# Patient Record
Sex: MALE | Race: WHITE | NOT HISPANIC OR LATINO
[De-identification: names, ages, dates, MRNs, and addresses within clinical notes are randomized per-mention and may not be internally consistent; named-entity substitution may affect disease eponyms.]

---

## 2017-01-11 ENCOUNTER — APPOINTMENT (OUTPATIENT)
Dept: CARDIOTHORACIC SURGERY | Facility: CLINIC | Age: 67
End: 2017-01-11

## 2017-01-11 VITALS
HEART RATE: 63 BPM | TEMPERATURE: 97.4 F | RESPIRATION RATE: 17 BRPM | WEIGHT: 189 LBS | OXYGEN SATURATION: 93 % | DIASTOLIC BLOOD PRESSURE: 97 MMHG | BODY MASS INDEX: 25.6 KG/M2 | HEIGHT: 72 IN | SYSTOLIC BLOOD PRESSURE: 171 MMHG

## 2017-01-11 DIAGNOSIS — I35.1 NONRHEUMATIC AORTIC (VALVE) INSUFFICIENCY: ICD-10-CM

## 2017-01-11 DIAGNOSIS — I65.23 OCCLUSION AND STENOSIS OF BILATERAL CAROTID ARTERIES: ICD-10-CM

## 2017-01-11 RX ORDER — BLOOD-GLUCOSE METER
KIT MISCELLANEOUS
Qty: 1 | Refills: 0 | Status: ACTIVE | COMMUNITY
Start: 2017-01-11 | End: 1900-01-01

## 2017-07-03 ENCOUNTER — RX RENEWAL (OUTPATIENT)
Age: 67
End: 2017-07-03

## 2017-09-05 ENCOUNTER — RX RENEWAL (OUTPATIENT)
Age: 67
End: 2017-09-05

## 2017-10-10 ENCOUNTER — RX RENEWAL (OUTPATIENT)
Age: 67
End: 2017-10-10

## 2017-10-10 ENCOUNTER — MEDICATION RENEWAL (OUTPATIENT)
Age: 67
End: 2017-10-10

## 2017-10-13 ENCOUNTER — APPOINTMENT (OUTPATIENT)
Dept: HEART AND VASCULAR | Facility: CLINIC | Age: 67
End: 2017-10-13
Payer: COMMERCIAL

## 2017-10-13 VITALS
BODY MASS INDEX: 27.67 KG/M2 | HEART RATE: 63 BPM | DIASTOLIC BLOOD PRESSURE: 94 MMHG | RESPIRATION RATE: 12 BRPM | WEIGHT: 204 LBS | TEMPERATURE: 97.8 F | OXYGEN SATURATION: 97 % | SYSTOLIC BLOOD PRESSURE: 188 MMHG

## 2017-10-13 DIAGNOSIS — I10 ESSENTIAL (PRIMARY) HYPERTENSION: ICD-10-CM

## 2017-10-13 DIAGNOSIS — E55.9 VITAMIN D DEFICIENCY, UNSPECIFIED: ICD-10-CM

## 2017-10-13 DIAGNOSIS — I77.810 THORACIC AORTIC ECTASIA: ICD-10-CM

## 2017-10-13 DIAGNOSIS — R73.09 OTHER ABNORMAL GLUCOSE: ICD-10-CM

## 2017-10-13 DIAGNOSIS — E78.5 HYPERLIPIDEMIA, UNSPECIFIED: ICD-10-CM

## 2017-10-13 DIAGNOSIS — C61 MALIGNANT NEOPLASM OF PROSTATE: ICD-10-CM

## 2017-10-13 PROCEDURE — 99214 OFFICE O/P EST MOD 30 MIN: CPT | Mod: 25

## 2017-10-13 PROCEDURE — 93306 TTE W/DOPPLER COMPLETE: CPT | Mod: XE

## 2017-10-13 PROCEDURE — 93880 EXTRACRANIAL BILAT STUDY: CPT | Mod: XE

## 2017-10-13 PROCEDURE — 93000 ELECTROCARDIOGRAM COMPLETE: CPT

## 2017-10-13 PROCEDURE — 36415 COLL VENOUS BLD VENIPUNCTURE: CPT

## 2017-10-16 LAB
25(OH)D3 SERPL-MCNC: 27.2 NG/ML
ALBUMIN SERPL ELPH-MCNC: 4.3 G/DL
ALP BLD-CCNC: 50 U/L
ALT SERPL-CCNC: 31 U/L
ANION GAP SERPL CALC-SCNC: 12 MMOL/L
APPEARANCE: CLEAR
AST SERPL-CCNC: 15 U/L
BACTERIA: NEGATIVE
BASOPHILS # BLD AUTO: 0.03 K/UL
BASOPHILS NFR BLD AUTO: 0.5 %
BILIRUB SERPL-MCNC: 0.7 MG/DL
BILIRUBIN URINE: NEGATIVE
BLOOD URINE: NEGATIVE
BUN SERPL-MCNC: 19 MG/DL
CALCIUM SERPL-MCNC: 9.6 MG/DL
CHLORIDE SERPL-SCNC: 102 MMOL/L
CHOLEST SERPL-MCNC: 160 MG/DL
CHOLEST/HDLC SERPL: 3.6 RATIO
CO2 SERPL-SCNC: 25 MMOL/L
COLOR: YELLOW
CREAT SERPL-MCNC: 1.14 MG/DL
EOSINOPHIL # BLD AUTO: 0.2 K/UL
EOSINOPHIL NFR BLD AUTO: 3.3 %
GLUCOSE QUALITATIVE U: NEGATIVE MG/DL
GLUCOSE SERPL-MCNC: 93 MG/DL
HBA1C MFR BLD HPLC: 5.5 %
HCT VFR BLD CALC: 47.9 %
HCV AB SER QL: NONREACTIVE
HCV S/CO RATIO: 0.17 S/CO
HDLC SERPL-MCNC: 45 MG/DL
HGB BLD-MCNC: 15.2 G/DL
IMM GRANULOCYTES NFR BLD AUTO: 0.2 %
KETONES URINE: NEGATIVE
LDLC SERPL CALC-MCNC: 97 MG/DL
LEUKOCYTE ESTERASE URINE: NEGATIVE
LYMPHOCYTES # BLD AUTO: 1.67 K/UL
LYMPHOCYTES NFR BLD AUTO: 27.6 %
MAGNESIUM SERPL-MCNC: 2 MG/DL
MAN DIFF?: NORMAL
MCHC RBC-ENTMCNC: 31.2 PG
MCHC RBC-ENTMCNC: 31.7 GM/DL
MCV RBC AUTO: 98.4 FL
MICROSCOPIC-UA: NORMAL
MONOCYTES # BLD AUTO: 0.5 K/UL
MONOCYTES NFR BLD AUTO: 8.3 %
NEUTROPHILS # BLD AUTO: 3.65 K/UL
NEUTROPHILS NFR BLD AUTO: 60.1 %
NITRITE URINE: NEGATIVE
PH URINE: 5.5
PLATELET # BLD AUTO: 206 K/UL
POTASSIUM SERPL-SCNC: 4.7 MMOL/L
PROT SERPL-MCNC: 7 G/DL
PROTEIN URINE: NEGATIVE MG/DL
PSA SERPL-MCNC: <0.01 NG/ML
RBC # BLD: 4.87 M/UL
RBC # FLD: 13.6 %
RED BLOOD CELLS URINE: 0 /HPF
SODIUM SERPL-SCNC: 139 MMOL/L
SPECIFIC GRAVITY URINE: 1.01
SQUAMOUS EPITHELIAL CELLS: 0 /HPF
TRIGL SERPL-MCNC: 90 MG/DL
TSH SERPL-ACNC: 1.24 UIU/ML
UROBILINOGEN URINE: NEGATIVE MG/DL
WBC # FLD AUTO: 6.06 K/UL
WHITE BLOOD CELLS URINE: 0 /HPF

## 2018-01-10 ENCOUNTER — APPOINTMENT (OUTPATIENT)
Dept: CARDIOTHORACIC SURGERY | Facility: CLINIC | Age: 68
End: 2018-01-10

## 2018-02-06 ENCOUNTER — APPOINTMENT (OUTPATIENT)
Dept: CT IMAGING | Facility: HOSPITAL | Age: 68
End: 2018-02-06

## 2018-02-06 ENCOUNTER — TRANSCRIPTION ENCOUNTER (OUTPATIENT)
Age: 68
End: 2018-02-06

## 2018-02-09 ENCOUNTER — OTHER (OUTPATIENT)
Age: 68
End: 2018-02-09

## 2018-03-29 ENCOUNTER — OTHER (OUTPATIENT)
Age: 68
End: 2018-03-29

## 2018-04-11 ENCOUNTER — RX RENEWAL (OUTPATIENT)
Age: 68
End: 2018-04-11

## 2018-04-13 ENCOUNTER — APPOINTMENT (OUTPATIENT)
Dept: CARDIOTHORACIC SURGERY | Facility: CLINIC | Age: 68
End: 2018-04-13

## 2018-07-23 ENCOUNTER — RX RENEWAL (OUTPATIENT)
Age: 68
End: 2018-07-23

## 2018-08-08 ENCOUNTER — OTHER (OUTPATIENT)
Age: 68
End: 2018-08-08

## 2018-08-09 ENCOUNTER — APPOINTMENT (OUTPATIENT)
Dept: CT IMAGING | Facility: HOSPITAL | Age: 68
End: 2018-08-09

## 2018-09-14 ENCOUNTER — APPOINTMENT (OUTPATIENT)
Dept: CARDIOTHORACIC SURGERY | Facility: CLINIC | Age: 68
End: 2018-09-14
Payer: COMMERCIAL

## 2018-09-14 VITALS
HEART RATE: 63 BPM | OXYGEN SATURATION: 99 % | SYSTOLIC BLOOD PRESSURE: 173 MMHG | WEIGHT: 182 LBS | TEMPERATURE: 97.9 F | HEIGHT: 72 IN | RESPIRATION RATE: 14 BRPM | DIASTOLIC BLOOD PRESSURE: 90 MMHG | BODY MASS INDEX: 24.65 KG/M2

## 2018-09-14 PROCEDURE — 99213 OFFICE O/P EST LOW 20 MIN: CPT

## 2018-10-09 ENCOUNTER — APPOINTMENT (OUTPATIENT)
Dept: HEART AND VASCULAR | Facility: CLINIC | Age: 68
End: 2018-10-09

## 2018-12-17 ENCOUNTER — APPOINTMENT (OUTPATIENT)
Dept: PULMONOLOGY | Facility: CLINIC | Age: 68
End: 2018-12-17
Payer: COMMERCIAL

## 2018-12-17 VITALS
HEART RATE: 61 BPM | WEIGHT: 192 LBS | SYSTOLIC BLOOD PRESSURE: 168 MMHG | RESPIRATION RATE: 14 BRPM | OXYGEN SATURATION: 98 % | BODY MASS INDEX: 26.01 KG/M2 | DIASTOLIC BLOOD PRESSURE: 89 MMHG | HEIGHT: 72 IN

## 2018-12-17 DIAGNOSIS — R91.1 SOLITARY PULMONARY NODULE: ICD-10-CM

## 2018-12-17 PROCEDURE — 94060 EVALUATION OF WHEEZING: CPT

## 2018-12-17 PROCEDURE — 99204 OFFICE O/P NEW MOD 45 MIN: CPT | Mod: 25

## 2018-12-17 PROCEDURE — 94727 GAS DIL/WSHOT DETER LNG VOL: CPT

## 2018-12-17 PROCEDURE — 94729 DIFFUSING CAPACITY: CPT | Mod: 59

## 2019-01-05 ENCOUNTER — RX RENEWAL (OUTPATIENT)
Age: 69
End: 2019-01-05

## 2019-01-06 RX ORDER — HYDROCHLOROTHIAZIDE 25 MG/1
25 TABLET ORAL DAILY
Qty: 14 | Refills: 0 | Status: ACTIVE | COMMUNITY
Start: 2017-10-13 | End: 1900-01-01

## 2019-02-01 ENCOUNTER — APPOINTMENT (OUTPATIENT)
Dept: ORTHOPEDIC SURGERY | Facility: CLINIC | Age: 69
End: 2019-02-01
Payer: COMMERCIAL

## 2019-02-01 VITALS — HEIGHT: 72 IN | BODY MASS INDEX: 25.73 KG/M2 | WEIGHT: 190 LBS

## 2019-02-01 DIAGNOSIS — M70.61 TROCHANTERIC BURSITIS, RIGHT HIP: ICD-10-CM

## 2019-02-01 DIAGNOSIS — M16.11 UNILATERAL PRIMARY OSTEOARTHRITIS, RIGHT HIP: ICD-10-CM

## 2019-02-01 PROCEDURE — 73502 X-RAY EXAM HIP UNI 2-3 VIEWS: CPT

## 2019-02-01 PROCEDURE — 99243 OFF/OP CNSLTJ NEW/EST LOW 30: CPT

## 2019-03-14 ENCOUNTER — RX RENEWAL (OUTPATIENT)
Age: 69
End: 2019-03-14

## 2019-10-09 ENCOUNTER — APPOINTMENT (OUTPATIENT)
Dept: CARDIOTHORACIC SURGERY | Facility: CLINIC | Age: 69
End: 2019-10-09
Payer: COMMERCIAL

## 2019-10-09 VITALS
BODY MASS INDEX: 25.73 KG/M2 | DIASTOLIC BLOOD PRESSURE: 94 MMHG | HEART RATE: 89 BPM | OXYGEN SATURATION: 97 % | SYSTOLIC BLOOD PRESSURE: 163 MMHG | HEIGHT: 72 IN | WEIGHT: 190 LBS | RESPIRATION RATE: 16 BRPM

## 2019-10-09 DIAGNOSIS — I71.2 THORACIC AORTIC ANEURYSM, W/OUT RUPTURE: ICD-10-CM

## 2019-10-09 PROCEDURE — 99213 OFFICE O/P EST LOW 20 MIN: CPT

## 2019-10-09 RX ORDER — OMEGA-3/DHA/EPA/FISH OIL 300-1000MG
CAPSULE ORAL
Refills: 0 | Status: DISCONTINUED | COMMUNITY
End: 2019-10-09

## 2019-10-09 RX ORDER — MULTIVITAMIN
TABLET ORAL DAILY
Qty: 90 | Refills: 0 | Status: ACTIVE | COMMUNITY
Start: 2019-10-09

## 2019-10-09 NOTE — PROCEDURE
[FreeTextEntry1] : \par Dr. Rivera discussed activity restrictions with the patient, and would advise exercise at a moderate amount with no heavy lifting over one third of body weight, and avoiding heart rates that exceed 140 beats per minute. Every patient must abstain from tobacco and illicit drug use, especially stimulants such as cocaine or methamphetamine. The patient was also counseled on maintaining a healthy heart diet, and losing any excessive weight as this also put undue stress on both the aorta and entire cardiovascular system. Patient was counseled on the importance of medication adherence for blood pressure control, as hypertension is a significant risk factor associated with aortic dissections. First degree family members should be screened for bicuspid valve disease, and ascending aortic aneurysms.\par \par Dr. Rivera reviewed the indications for surgery, and used our webpage [email or web address] to illustrate the aorta and anatomy of the heart. Those indications are the following: size greater than 5.0 cm, symptomatic aneurysms, family history of aortic dissection or aneurysm death with a size greater than 4.5 cm, other necessary cardiac procedures such as coronary artery bypass grafting or severe valvular disorders with an aneurysm greater than 4.5 cm, or connective tissue disorders with an aneurysm size greater than 4.5 cm. The patient does not meet size criteria for surgical intervention at the time.

## 2019-10-09 NOTE — ASSESSMENT
[FreeTextEntry1] : 69 year old male with a past medical history of hypertension, hyperlipidemia, prostate cancer s/p prostatectomy in 2006, presents today for evaluation and management of an aortic root and ascending aortic aneurysm. \par \par CTA chest 10/3/19: aortic root 4.5cm, ascending aorta 4.5cm, descending aorta 2.8cm. no significant change since prior exam. \par  \par The patient's medical records and diagnostic images were reviewed at the time of this office visit, and the following recommendation was made. Review of the imaging shows aortic pathology has remained stable and does not require surgical intervention at this time.\par \par Plan\par \par 1. [facility] in [facility] years with CTA chest. \par 2. Continue medication regimen.\par 3. Follow up with cardiologist and PCP.\par

## 2019-10-09 NOTE — END OF VISIT
[FreeTextEntry3] : Written by Barby Bolivar NP, acting as a scribe for Dr. Albert Rivera.\par “The documentation recorded by the scribe accurately reflects the service I personally performed and the decisions made by me.” Signature, Albert Rivera MD\par \par

## 2019-10-09 NOTE — PHYSICAL EXAM
[Sclera] : the sclera and conjunctiva were normal [Extraocular Movements] : extraocular movements were intact [Neck Appearance] : the appearance of the neck was normal [Respiration, Rhythm And Depth] : normal respiratory rhythm and effort [Jugular Venous Distention Increased] : there was no jugular-venous distention [Heart Rate And Rhythm] : heart rate was normal and rhythm regular [Auscultation Breath Sounds / Voice Sounds] : lungs were clear to auscultation bilaterally [Apical Impulse] : the apical impulse was normal [Heart Sounds] : normal S1 and S2 [Examination Of The Chest] : the chest was normal in appearance [2+] : left 2+ [Bowel Sounds] : normal bowel sounds [Abdomen Soft] : soft [Abdomen Tenderness] : non-tender [No CVA Tenderness] : no ~M costovertebral angle tenderness [Supraclavicular Lymph Nodes Enlarged Bilaterally] : supraclavicular [Cervical Lymph Nodes Enlarged Posterior Bilaterally] : posterior cervical [Musculoskeletal - Swelling] : no joint swelling seen [Abnormal Walk] : normal gait [Skin Color & Pigmentation] : normal skin color and pigmentation [Skin Turgor] : normal skin turgor [Motor Tone] : muscle strength and tone were normal [No Focal Deficits] : no focal deficits [] : no rash [Oriented To Time, Place, And Person] : oriented to person, place, and time [Impaired Insight] : insight and judgment were intact [Affect] : the affect was normal [FreeTextEntry1] : Voids without difficulty

## 2019-10-09 NOTE — HISTORY OF PRESENT ILLNESS
[FreeTextEntry1] : 69 year old male with a past medical history of hypertension, hyperlipidemia, prostate cancer s/p prostatectomy in 2006, presents today for evaluation and management of an aortic root and ascending aortic aneurysm. \par \par CTA chest 10/3/19: aortic root 4.5cm, ascending aorta 4.5cm, descending aorta 2.8cm. no significant change since prior exam. \par  \par Patient is doing well and denies recent hospitalization, ER visits, or surgeries. He  denies fever, chills, fatigue, headache, blurred vision, dizziness, syncope, chest pain, palpitations, shortness of breath, orthopnea, paroxysmal nocturnal dyspnea, nausea, vomiting, abdominal pain, back pain, BRBPR or swelling to legs.\par \par

## 2019-10-09 NOTE — DATA REVIEWED
[FreeTextEntry1] : \par CTA chest 10/3/19: aortic root 4.5cm, ascending aorta 4.5cm, descending aorta 2.8cm. \par \par CTA of the Chest 8/29/2018:\par -Aortic Root: 4.4 cm\par -Ascending thoracic aorta: 4.3 cm\par -Descending thoracic aorta: 2.6 cm\par -2 LLL parenchymal nodules, measuring 4 mm and 5 mm\par \par Echocardiogram 10/13/2017:\par Normal right and left ventricular systolic function. Abnormal LV diastolic function.Ejection fraction is 65% by visual estimate.LVH. \par 2. At least mild MR and TX.Trace AR and TR. \par 3. No pericardial effusion. \par 4. Left atria is enlarged.Aortic root (4.5 cm) and ascending aorta 4.6 cm are dilated.

## 2019-10-09 NOTE — CONSULT LETTER
[Dear  ___] : Dear  [unfilled], [Courtesy Letter:] : I had the pleasure of seeing your patient, [unfilled], in my office today. [FreeTextEntry2] : Samantha Cullen MD [FreeTextEntry1] : I had the pleasure of seeing your patient, PAIGE TONY, in my office today. We take a multidisciplinary team approach to patient care and consider you, the referring physician, an extension of our team. We will maintain an open line of communication with you throughout your patient's treatment course.  \par \par Mr. TONY presents for one year follow up visit for evaluation and management of thoracic aortic aneurysm. I have reviewed all of his medical records and diagnostic images at the time of his office consultation. I have enclosed a copy for your records. \par \par I have reviewed the indications for surgery,and used our webpage [email or web address] <[email or web address]> to illustrate the aorta and anatomy of the heart. The patient does not meet size criteria for surgical intervention at the time. Review of the imaging shows his  aortic pathology has remained stable. Therefore, I have recommended that the patient will require a follow up appointment in 2 years with CTA chest to monitor aortic pathology. My office will assist the patient with his upcoming appointment and I will update you on his progress at that time.\par \par I have discussed with the patient that we will continue to monitor his aortic pathology closely at the [facility] at [facility] that encompasses the entire health care system and is one of the largest in the nation at this point.\par \par It is our commitment to provide your patient with the highest quality of advanced therapeutic options. On behalf of the Cardiothoracic Surgery Team, thank you for sending your patient to the [facility] based at [facility].  If there are any questions or concerns, please call me directly at [phone].  \par \par Please see my note below. \par \par Sincerely, \par \par \par \par \par \par Albert Rivera M.D.\par Professor of Cardiovascular and Thoracic Surgery\par Minimally Invasive Valve Surgeon\par Director of Aortic Surgery, [facility]\par Cell: [phone]\par Email: [email or web address] \par \par [facility]:\par [address], [address] Floor, [place] [address]\par Office: [phone]\par Fax: [phone]\par \par [facility]:\par Department of Cardiovascular and Thoracic Surgery\par [address] [place] [address], [address]\par Office: [phone]\par Fax: [phone]\par \par Practice Manager: Ms. Maria Guadalupe Shah\par Email: [email or web address] Phone: [phone]\par \par \par \par \par

## 2020-11-22 ENCOUNTER — EMERGENCY (EMERGENCY)
Facility: HOSPITAL | Age: 70
LOS: 1 days | Discharge: HOME | End: 2020-11-22
Attending: EMERGENCY MEDICINE | Admitting: EMERGENCY MEDICINE
Payer: COMMERCIAL

## 2020-11-22 VITALS
HEART RATE: 67 BPM | RESPIRATION RATE: 16 BRPM | TEMPERATURE: 98 F | WEIGHT: 182.1 LBS | SYSTOLIC BLOOD PRESSURE: 167 MMHG | DIASTOLIC BLOOD PRESSURE: 88 MMHG | OXYGEN SATURATION: 96 %

## 2020-11-22 DIAGNOSIS — Z20.828 CONTACT WITH AND (SUSPECTED) EXPOSURE TO OTHER VIRAL COMMUNICABLE DISEASES: ICD-10-CM

## 2020-11-22 LAB — SARS-COV-2 RNA SPEC QL NAA+PROBE: SIGNIFICANT CHANGE UP

## 2020-11-22 PROCEDURE — 99283 EMERGENCY DEPT VISIT LOW MDM: CPT

## 2020-11-22 PROCEDURE — U0003: CPT

## 2020-11-22 NOTE — ED PROVIDER NOTE - NSFOLLOWUPINSTRUCTIONS_ED_ALL_ED_FT
[facility]                                                                                  COVID-19: How to Protect Yourself and Others      Know how it spreads    •There is currently no vaccine to prevent coronavirus disease 2019 (COVID-19).       • The best way to prevent illness is to avoid being exposed to this virus.     •The virus is thought to spread mainly from person-to-person.   •Between people who are in close contact with one another (within about 6 feet).       •Through respiratory droplets produced when an infected person coughs, sneezes or talks.       •These droplets can land in the mouths or noses of people who are nearby or possibly be inhaled into the lungs.       •COVID-19 may be spread by people who are not showing symptoms.          Everyone should    Clean your hands often     • Wash your hands often with soap and water for at least 20 seconds especially after you have been in a public place, or after blowing your nose, coughing, or sneezing.       •If soap and water are not readily available, use a hand  that contains at least 60% alcohol. Cover all surfaces of your hands and rub them together until they feel dry.      • Avoid touching your eyes, nose, and mouth with unwashed hands.      Avoid close contact     • Limit contact with others as much as possible.       • Avoid close contact with people who are sick.     • Put distance between yourself and other people.    •Remember that some people without symptoms may be able to spread virus.       •This is especially important for people who are at higher risk of getting very sick.www.cdc.gov/coronavirus/2019-ncov/need-extra-precautions/people-at-higher-risk.html        Cover your mouth and nose with a mask when around others     • You could spread COVID-19 to others even if you do not feel sick.     • Everyone should wear a mask in public settings and when around people not living in their household, especially when social distancing is difficult to maintain.  •Masks should not be placed on young children under age 2, anyone who has trouble breathing, or is unconscious, incapacitated or otherwise unable to remove the mask without assistance.        •  The mask is meant to protect other people in case you are infected.      •Do NOT use a facemask meant for a healthcare worker.       •Continue to keep about 6 feet between yourself and others. The mask is not a substitute for social distancing.      Cover coughs and sneezes     • Always cover your mouth and nose with a tissue when you cough or sneeze or use the inside of your elbow.      • Throw used tissues in the trash.       •Immediately wash your hands with soap and water for at least 20 seconds. If soap and water are not readily available, clean your hands with a hand  that contains at least 60% alcohol.      Clean and disinfect     • Clean AND disinfect frequently touched surfaces daily. This includes tables, doorknobs, light switches, countertops, handles, desks, phones, keyboards, toilets, faucets, and sinks. www.cdc.gov/coronavirus/2019-ncov/prevent-getting-sick/disinfecting-your-home.html      • If surfaces are dirty, clean them: Use detergent or soap and water prior to disinfection.      • Then, use a household disinfectant. You can see a list of EPA-registered household disinfectants here.      cdc.gov/coronavirus      09/02/2020    This information is not intended to replace advice given to you by your health care provider. Make sure you discuss any questions you have with your health care provider.      Document Revised: 09/10/2020 Document Reviewed: 07/09/2020    [employer] Patient Education © 2020 [employer]

## 2020-11-22 NOTE — ED ADULT NURSE NOTE - CHPI ED NUR SYMPTOMS NEG
no abdominal pain/no cough/no rash/no chills/no shortness of breath/no vomiting/no decreased eating/drinking/no fever/no diarrhea/no headache

## 2020-11-22 NOTE — ED ADULT TRIAGE NOTE - CHIEF COMPLAINT QUOTE
My daughter just came home from college and was alerted that someone in her dorm tested positive for the virus. I am being cautious.

## 2020-11-22 NOTE — ED PROVIDER NOTE - OBJECTIVE STATEMENT
70M h/o HTN requesting a covid swab. Pt has no sx, his daughter just returned from college, she tested negative but her friend tested + for covid, so pt wants to be cautious. Understands to quarantine until results are back.

## 2020-11-22 NOTE — ED PROVIDER NOTE - PATIENT PORTAL LINK FT
You can access the FollowMyHealth Patient Portal offered by [facility] by registering at the following website: [email or web address]. By joining [employer]s FollowMyHealth portal, you will also be able to view your health information using other applications (apps) compatible with our system.

## 2020-11-22 NOTE — ED ADULT NURSE NOTE - OBJECTIVE STATEMENT
Patient presents to ED alert and oriented x3 with requesting to be tested for COVID 19. Patient states his daughter came home from college this weekend and had close contact with someone who tested positive. Patient denies symptoms at this time.

## 2023-06-19 ENCOUNTER — OFFICE (OUTPATIENT)
Dept: URBAN - METROPOLITAN AREA CLINIC 35 | Facility: CLINIC | Age: 73
Setting detail: OPHTHALMOLOGY
End: 2023-06-19
Payer: COMMERCIAL

## 2023-06-19 DIAGNOSIS — H25.13: ICD-10-CM

## 2023-06-19 DIAGNOSIS — H43.393: ICD-10-CM

## 2023-06-19 DIAGNOSIS — H52.11: ICD-10-CM

## 2023-06-19 PROBLEM — H52.02 HYPEROPIA; LEFT EYE: Status: ACTIVE | Noted: 2023-06-19

## 2023-06-19 PROCEDURE — 92014 COMPRE OPH EXAM EST PT 1/>: CPT | Performed by: OPHTHALMOLOGY

## 2023-06-19 ASSESSMENT — SPHEQUIV_DERIVED
OS_SPHEQUIV: 0.75
OD_SPHEQUIV: -3.125
OS_SPHEQUIV: 0.375
OS_SPHEQUIV: 0.875
OS_SPHEQUIV: 0.25
OD_SPHEQUIV: -3.875
OS_SPHEQUIV: 1.25
OS_SPHEQUIV: 0.25
OD_SPHEQUIV: -4
OD_SPHEQUIV: -0.625
OS_SPHEQUIV: 0.375
OD_SPHEQUIV: -2.875
OD_SPHEQUIV: -2.625

## 2023-06-19 ASSESSMENT — REFRACTION_MANIFEST
OD_CYLINDER: -0.75
OS_AXIS: 065
OS_SPHERE: +2.75
OD_CYLINDER: -0.75
OD_SPHERE: -2.25
OD_AXIS: 150
OD_SPHERE: -3.00
OS_SPHERE: +1.50
OS_AXIS: 70
OS_VA1: 20/20
OD_AXIS: 135
OD_CYLINDER: -1.75
OS_CYLINDER: -1.00
OS_CYLINDER: -3.00
OD_CYLINDER: -0.75
OD_VA1: 20/20
OD_AXIS: 90
OD_VA1: 20/30
OD_VA1: 20/30-
OU_VA: 20/20+2
OS_AXIS: 075
OS_VA1: 20/20
OS_AXIS: 075
OD_SPHERE: PLANO
OS_VA1: 20/20
OS_AXIS: 079
OD_AXIS: 110
OS_CYLINDER: -2.25
OD_SPHERE: -2.00
OS_VA1: 20/20
OS_CYLINDER: -2.50
OS_CYLINDER: -1.25
OS_SPHERE: +1.25
OD_SPHERE: -0.25
OS_SPHERE: +1.50
OS_VA1: 20/20
OS_SPHERE: +1.00
OS_CYLINDER: -1.25
OD_SPHERE: -2.75
OS_AXIS: 075
OD_VA1: 20/20-3
OS_SPHERE: +1.50
OD_AXIS: 102
OS_VA1: 20/20
OD_CYLINDER: -1.75
OD_AXIS: 150
OD_VA1: 20/25-

## 2023-06-19 ASSESSMENT — TONOMETRY
OS_IOP_MMHG: 18
OD_IOP_MMHG: 18

## 2023-06-19 ASSESSMENT — VISUAL ACUITY
OS_BCVA: 20/200
OD_BCVA: 20/60

## 2023-06-19 ASSESSMENT — REFRACTION_CURRENTRX
OD_SPHERE: +2.25
OD_OVR_VA: 20/
OS_SPHERE: +2.25
OS_OVR_VA: 20/

## 2023-06-19 ASSESSMENT — REFRACTION_AUTOREFRACTION
OS_CYLINDER: -2.50
OS_AXIS: 075
OD_AXIS: 110
OD_SPHERE: -3.00
OD_CYLINDER: -2.00
OS_SPHERE: +1.50

## 2023-06-19 ASSESSMENT — AXIALLENGTH_DERIVED
OS_AL: 26.0025
OD_AL: 27.28
OD_AL: 27.1534
OD_AL: 27.025
OS_AL: 26.1812
OS_AL: 26.2413
OS_AL: 26.2413
OS_AL: 25.77
OS_AL: 25.94
OD_AL: 27.68
OS_AL: 26.1812
OD_AL: 27.75
OD_AL: 26.0398

## 2023-06-19 ASSESSMENT — KERATOMETRY
OD_K2POWER_DIOPTERS: 38.50
OS_AXISANGLE_DEGREES: 155
OS_K2POWER_DIOPTERS: 37.25
OS_K1POWER_DIOPTERS: 36.25
METHOD_AUTO_MANUAL: AUTO
OD_AXISANGLE_DEGREES: 047
OD_K1POWER_DIOPTERS: 37.75

## 2023-06-19 ASSESSMENT — CONFRONTATIONAL VISUAL FIELD TEST (CVF)
OS_FINDINGS: FULL
OD_FINDINGS: FULL

## 2023-11-30 ENCOUNTER — OUTPATIENT (OUTPATIENT)
Dept: OUTPATIENT SERVICES | Facility: HOSPITAL | Age: 73
LOS: 1 days | End: 2023-11-30
Payer: COMMERCIAL

## 2023-11-30 ENCOUNTER — APPOINTMENT (OUTPATIENT)
Dept: RADIOLOGY | Facility: HOSPITAL | Age: 73
End: 2023-11-30
Payer: COMMERCIAL

## 2023-11-30 DIAGNOSIS — R07.81 PLEURODYNIA: ICD-10-CM

## 2023-11-30 PROBLEM — I10 ESSENTIAL (PRIMARY) HYPERTENSION: Chronic | Status: ACTIVE | Noted: 2020-11-22

## 2023-11-30 PROCEDURE — 71101 X-RAY EXAM UNILAT RIBS/CHEST: CPT

## 2023-11-30 PROCEDURE — 71101 X-RAY EXAM UNILAT RIBS/CHEST: CPT | Mod: 26,RT

## 2023-12-01 ENCOUNTER — APPOINTMENT (OUTPATIENT)
Dept: CARDIOLOGY | Facility: CLINIC | Age: 73
End: 2023-12-01

## 2024-08-19 ENCOUNTER — EMERGENCY (EMERGENCY)
Facility: HOSPITAL | Age: 74
LOS: 1 days | Discharge: HOME | End: 2024-08-19
Attending: STUDENT IN AN ORGANIZED HEALTH CARE EDUCATION/TRAINING PROGRAM | Admitting: STUDENT IN AN ORGANIZED HEALTH CARE EDUCATION/TRAINING PROGRAM
Payer: COMMERCIAL

## 2024-08-19 VITALS
OXYGEN SATURATION: 97 % | RESPIRATION RATE: 18 BRPM | HEART RATE: 75 BPM | WEIGHT: 199.96 LBS | TEMPERATURE: 99 F | DIASTOLIC BLOOD PRESSURE: 91 MMHG | SYSTOLIC BLOOD PRESSURE: 175 MMHG | HEIGHT: 72 IN

## 2024-08-19 PROCEDURE — 99284 EMERGENCY DEPT VISIT MOD MDM: CPT

## 2024-08-19 PROCEDURE — 99284 EMERGENCY DEPT VISIT MOD MDM: CPT | Mod: 25

## 2024-08-19 PROCEDURE — 93971 EXTREMITY STUDY: CPT

## 2024-08-19 PROCEDURE — 73590 X-RAY EXAM OF LOWER LEG: CPT | Mod: 26,RT

## 2024-08-19 PROCEDURE — 73590 X-RAY EXAM OF LOWER LEG: CPT

## 2024-08-19 PROCEDURE — 93971 EXTREMITY STUDY: CPT | Mod: 26,RT

## 2024-08-19 RX ORDER — CEPHALEXIN 250 MG
1 CAPSULE ORAL
Qty: 21 | Refills: 0
Start: 2024-08-19 | End: 2024-08-25

## 2024-08-19 NOTE — ED PROVIDER NOTE - CARE PROVIDERS DIRECT ADDRESSES
[email or web address]

## 2024-08-19 NOTE — ED ADULT TRIAGE NOTE - CHIEF COMPLAINT QUOTE
Pt c/o right calf pain/hard to touch, stated hit leg with a metal ornament 10 days ago, dent in by PMD to r/o DVT, no blood thinners

## 2024-08-19 NOTE — ED PROVIDER NOTE - PHYSICAL EXAMINATION
CONSTITUTIONAL: NAD  SKIN: Warm dry  HEAD: NCAT  EYES: NL inspection  ENT: MMM  NECK: Supple; non tender.  CARD: RRR  RESP: CTAB  ABD: S/NT no R/G  EXT: no pedal edema  +mild erythema/warmth about 4x2cm of medial right ll   NEURO: Grossly unremarkable  PSYCH: Cooperative, appropriate. CONSTITUTIONAL: NAD  SKIN: Warm dry  HEAD: NCAT  EYES: NL inspection  EXT: no pedal edema  +mild erythema/warmth about 4x2cm of medial right calf   NEURO: Grossly unremarkable  PSYCH: Cooperative, appropriate.

## 2024-08-19 NOTE — ED PROVIDER NOTE - NSFOLLOWUPINSTRUCTIONS_ED_ALL_ED_FT
Phlebitis  Phlebitis is soreness and swelling (inflammation) of a vein. This can occur in your arms, legs, or torso (trunk), as well as deeper inside your body. Phlebitis is usually not serious when it occurs close to the surface of the body. However, it can cause serious problems when it occurs deeper inside the body.  What are the causes?  Phlebitis can be caused by:  Having a needle, IV tube, or long, thin tube (catheter) put in the vein.Getting certain medicines or solutions through an IV tube. Some medicines or solutions, such as antibiotics and cancer medicines, can irritate the vein.Having an IV tube for a long period of time.Having an IV placed on the part of the arm or hand that moves a lot.A blood clot.Infection of the vein.Surgery on a vein.What increases the risk?  The following factors may make you more likely to develop this condition:  Being overweight or obese.Pregnancy.Cancer.Reduced or slowing of blood flow through your veins. This can be caused by:  Bed rest over a long period of time.Long distance travel.Injury.Surgery.Congestive heart failure.Inactivity.Smoking.Taking birth control pills or hormone replacement therapy.Varicose veins.Inflammatory diseases or blood disorders that increase clotting.Taking illegal drugs through the vein.Having a history of blood clots.What are the signs or symptoms?  Symptoms of this condition include:  A red, tender, swollen, and painful area on your skin. Usually, the area is long and narrow, and may spread.The affected area feeling warm to the touch.Firmness along the center of the affected area.Low fever.How is this diagnosed?  This condition is diagnosed based on:  Your symptoms.A physical exam.Your medical history, including your family history.Other tests may be done to rule out other conditions, such as blood clots, especially if you have a history of blood clots or are at higher risk of developing blood clots. These may include:  Blood tests.Ultrasound exam.Genetic tests.Biopsy. This is when a tissue is taken from the body for examination. This is rare.How is this treated?  Treatment depends on the severity of the condition as well as the location of the inflammation. In most cases, the condition is minor and gets better quickly. Treatment may include:  Applying a warm compress or heating pad.Wearing compression stockings or bandages.Medicines, such as:  Anti-inflammatory medicines.Antibiotic medicines if an infection is present.Blood-thinning medicines if a blood clot is suspected or present, or if you have a history of blood clots or a blood disorder.Removing an IV tube that may be causing the problem.Using a different medicine or solution that will not irritate the vein.In rare cases, surgery may be needed to remove a damaged section of a vein.  Follow these instructions at home:  Managing pain, stiffness, and swelling     If directed, apply heat to the affected area as often as told by your health care provider. Use the heat source that your health care provider recommends, such as a moist heat pack or a heating pad.  Place a towel between your skin and the heat source.Leave the heat on for 20–30 minutes.Remove the heat if your skin turns bright red. This is especially important if you are unable to feel pain, heat, or cold. You may have a greater risk of getting burned.Raise (elevate) the affected area above the level of your heart while you are sitting or lying down.Medicines     Take over-the-counter and prescription medicines only as told by your health care provider.If you were prescribed an antibiotic, take it as told by your health care provider. Do not stop using the antibiotic even if you start to feel better.Carry a medical alert card or wear your medical alert jewelry to show that you are on blood thinners, if this applies.General instructions        If you have phlebitis in your legs:  Avoid sitting or standing for a long time.Keep your legs moving.Try to take short walks to break up long periods of sitting.Try to avoid bed rest that lasts for long periods of time. Regular sleep is not part of bed rest.Wear compression stockings as told by your health care provider. These stockings reduce swelling in your legs and help to prevent blood clots. They also help to prevent the condition from coming back.Do not use any products that contain nicotine or tobacco, such as cigarettes and e-cigarettes. If you need help quitting, ask your health care provider.Keep all follow-up visits as told by your health care provider. This is important. This may include any follow-up blood tests.Contact a health care provider if:  You have unusual bruising or any bleeding problems.Your symptoms do not get better, or your symptoms get worse.You are on anti-inflammatory medicines and you develop abdominal pain.Get help right away if:  You suddenly have chest pain or trouble breathing.You have a fever and your symptoms suddenly get worse.You cough up blood.You feel lightheaded or pass out.You have severe pain and swelling in the affected arm or leg.These symptoms may represent a serious problem that is an emergency. Do not wait to see if the symptoms will go away. Get medical help right away. Call your local emergency services (911 in the [place] Do not drive yourself to the hospital.   Summary  Phlebitis is soreness and swelling (inflammation) of a vein.Phlebitis is usually not serious when it occurs close to the surface of the body. However, it can cause serious problems when it occurs deeper inside the body.Treatment depends on the severity of the condition and the location of the inflammation.Raise (elevate) the affected area above the level of your heart while you are sitting or lying down.This information is not intended to replace advice given to you by your health care provider. Make sure you discuss any questions you have with your health care provider.

## 2024-08-19 NOTE — ED PROVIDER NOTE - CARE PLAN
Principal Discharge DX:	Thrombophlebitis   1 Principal Discharge DX:	Thrombophlebitis  Secondary Diagnosis:	Cellulitis

## 2024-08-19 NOTE — ED PROVIDER NOTE - CLINICAL SUMMARY MEDICAL DECISION MAKING FREE TEXT BOX
74-year-old male with history of hypertension presents with right calf swelling.  Patient noticed right calf swelling for the last 4 days after about a week ago he accidentally bumped his right lower leg. Denies RT leg pain - was able to still exercise today without issues. Denies numbness tingling weakness history of DVT PE, denies history of hormone use shortness of breath difficulty breathing.  Patient admits he does take prolonged flights but he is often on his feet and walking throughout the duration of the trips  Exam as stated  DDX includes but not limited to fx, dvt, cellulitis, phlebitis, msk/contusion  Pt found to have superficial thrombophlebitis - with overlying swelling and erythema/warmth likely early cellulitis. Advised to take NSAID and compression + abx.   Patient to be discharged from ED. Any available test results were discussed with patient and/or family. Verbal instructions given, including instructions to return to ED immediately for any new, worsening, or concerning symptoms. Patient endorsed understanding. Written discharge instructions additionally given, including follow-up plan.

## 2024-08-19 NOTE — ED PROVIDER NOTE - PATIENT PORTAL LINK FT
You can access the FollowMyHealth Patient Portal offered by [facility] by registering at the following website: [email or web address]. By joining [facility]’s FollowMyHealth portal, you will also be able to view your health information using other applications (apps) compatible with our system.

## 2024-08-19 NOTE — ED PROVIDER NOTE - CARE PROVIDER_API CALL
Samantha Cullen  Family Medicine  [address] [place] [address], [address]  [place] [address]  Phone: [phone]  Fax: [phone]  Follow Up Time: 7-10 Days

## 2024-08-19 NOTE — ED PROVIDER NOTE - OBJECTIVE STATEMENT
74-year-old male with history of hypertension presents with right calf swelling.  Patient noticed right calf swelling for the last 4 days after about a week ago he accidentally bumped his right lower leg. Denies RT leg pain - was able to still exercise today without issues. Denies numbness tingling weakness history of DVT PE, denies history of hormone use shortness of breath difficulty breathing.  Patient admits he does take prolonged flights but he is often on his feet and walking throughout the duration of the trips
